# Patient Record
Sex: MALE | HISPANIC OR LATINO | Employment: PART TIME | URBAN - METROPOLITAN AREA
[De-identification: names, ages, dates, MRNs, and addresses within clinical notes are randomized per-mention and may not be internally consistent; named-entity substitution may affect disease eponyms.]

---

## 2024-01-09 ENCOUNTER — TELEPHONE (OUTPATIENT)
Dept: ORTHOPEDIC SURGERY | Facility: CLINIC | Age: 54
End: 2024-01-09

## 2024-01-09 NOTE — TELEPHONE ENCOUNTER
Caller: [facility]ANIA [employer] [facility]     Relationship to patient: WORK COMP     Best call back number: REQUESTING CALL BACK TO PATIENT [phone]    Chief complaint: LEFT SHOULDER     Type of visit: MRI FOLLOW UP  MRI MOVED UP TO 01/22/24    Requested date: WEEK OF 01/22/24     If rescheduling, when is the original appointment: 02/26/24     Additional notes:WORK COMP

## 2024-01-22 ENCOUNTER — HOSPITAL ENCOUNTER (OUTPATIENT)
Dept: MRI IMAGING | Facility: HOSPITAL | Age: 54
Discharge: HOME | End: 2024-01-22
Admitting: PHYSICIAN ASSISTANT
Payer: COMMERCIAL

## 2024-01-22 DIAGNOSIS — Z47.89 ORTHOPEDIC AFTERCARE: ICD-10-CM

## 2024-01-22 DIAGNOSIS — M25.512 ACUTE PAIN OF LEFT SHOULDER: ICD-10-CM

## 2024-01-22 PROCEDURE — 73221 MRI JOINT UPR EXTREM W/O DYE: CPT

## 2024-01-25 ENCOUNTER — OFFICE VISIT (OUTPATIENT)
Dept: ORTHOPEDIC SURGERY | Facility: CLINIC | Age: 54
End: 2024-01-25
Payer: COMMERCIAL

## 2024-01-25 VITALS — BODY MASS INDEX: 36.51 KG/M2 | HEIGHT: 70 IN | WEIGHT: 255 LBS | HEART RATE: 81 BPM

## 2024-01-25 DIAGNOSIS — M25.512 ACUTE PAIN OF LEFT SHOULDER: Primary | ICD-10-CM

## 2024-01-25 DIAGNOSIS — M75.122 COMPLETE TEAR OF LEFT ROTATOR CUFF, UNSPECIFIED WHETHER TRAUMATIC: ICD-10-CM

## 2024-01-25 RX ORDER — CELECOXIB 200 MG/1
200 CAPSULE ORAL DAILY
Qty: 30 CAPSULE | Refills: 2 | Status: SHIPPED | OUTPATIENT
Start: 2024-01-25

## 2024-01-25 NOTE — PROGRESS NOTES
"     Patient ID: Ajith Katz is a 54 y.o. male.  8/23/23 left shoulder arthroscopy with supraspinatus repair and subacromial decompression  Having continued pain despite therapy    Review of Systems:        Objective:    Pulse 81   Ht 177.8 cm (70\")   Wt 116 kg (255 lb)   BMI 36.59 kg/m²     Physical Examination:  Left shoulder healed incision passive elevation 170 abduction 140 external rotation 50 he demonstrates pain and mild weakness on Speed Estill supraspinatus testing, however belly press and liftoff are 5/5.  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

## 2024-01-30 ENCOUNTER — TREATMENT (OUTPATIENT)
Dept: PHYSICAL THERAPY | Facility: CLINIC | Age: 54
End: 2024-01-30
Payer: COMMERCIAL

## 2024-01-30 DIAGNOSIS — Z98.890 S/P LEFT ROTATOR CUFF REPAIR: ICD-10-CM

## 2024-01-30 DIAGNOSIS — R29.898 LEFT ARM WEAKNESS: ICD-10-CM

## 2024-01-30 DIAGNOSIS — M25.512 CHRONIC LEFT SHOULDER PAIN: Primary | ICD-10-CM

## 2024-01-30 DIAGNOSIS — G89.29 CHRONIC LEFT SHOULDER PAIN: Primary | ICD-10-CM

## 2024-01-30 NOTE — PROGRESS NOTES
Physical Therapy Re-Evaluation and Treatment Note  [address] [address] [address]  [place] [address]      Patient: Ajith Katz  : 1970  Referring Practitioner: Andreas Turcios, *  Date of Initial Visit: Type: THERAPY  Noted: 2023  Today's Date: 2024  Patient seen for: 36 sessions      Visit Diagnoses:     ICD-10-CM ICD-9-CM   1. S/P left rotator cuff repair  Z98.890 V45.89   2. Chronic left shoulder pain  M25.512 719.41    G89.29 338.29   3. Left arm weakness  R29.898 729.89       Subjective   Ajith Katz reports the shldr pain is 0 -8 ( with certain movements).  No NT.   No difficulty with sleep ( BSL).     He has been doing pulleys,     Subjective Questionnaire: QuickDASH:  37/55 ( 40/55  23)     Objective   L shldr  Flex:   120A/ 155AA  Abd   101A/  148AA  ER @ 0 abd  43A  BTH  mid back of the head  BTB    mid buttock     MMT:  4- all L shldr planes,    L elbow 5/5   See Exercise, Manual, and Modality Logs for complete treatment.     Patient Education:  review of the current treatment plan for 3x per week x's 1 month per MD order.  Treatment focus on ROM and strengthening.       Assessment & Plan       Plan  Therapy options: will be seen for skilled therapy services  Planned modality interventions: cryotherapy, high voltage pulsed current (pain management), ultrasound and thermotherapy (hydrocollator packs)  Planned therapy interventions: manual therapy, neuromuscular re-education, stretching, strengthening, transfer training, therapeutic activities, home exercise program, joint mobilization, flexibility and soft tissue mobilization  Frequency: 3x week  Duration in weeks: 12      Ajith has returned to PT as ortho has written another order for therapy x's 1 month.   MRI completed wo evidence of retear of RC, presence of supraspinatus and infraspinatus tendonitis,  sub-acromial bursitis and mild GH arthritis noted.   He is seen in the clinic today for a  30-day progress note and treatment.  There is min change of active/active assist motion or strength from his last session at the end of Dec.  OPPT remains indicated in order to achieve the stated goals and achieve maximal functional mobility with ultimate goal of return to work.    STGs in 4 weeks:  Decrease pain to 4/10 on average -- MET 10/6  1)Increase L shld PROM to 90 degrees flex & 45 degrees ER -- MET 10/6  2)Pt will be compliant with HEP & 3)protocol/precautions -- MET 10/6     LTGs by discharge  1)  Increase L shld/elbow ROM to WFL/WNL with min/no pain -- PROGRESSING, increasing motion but still limited and with pain  2)  Increase UE strength to 5/5 -- NOT MET  3)  Pt will be able to sit/ride/drive 30-60 mins without difficulty or pain -- PROGRESSING, 5-10 min  4)  Pt will be able to wash/dress/groom without difficulty or pain -- progressing 12/4/23  5)  Pt will be able to reach/lift items into/out of an overhead cabinet without difficulty or pain --  met 1/30/24   6)  Pt will be able to lift/carry laundry baskets, garbage/grocery bags, pots/pans, O2 tanks without difficulty or pain -- partial met 1/30/24   7)  Pt will be able to perform essential job duties including performing: CPR, intubations, EKGs, blood draws, and assisting movement of patients or preventing them from falling with minimal difficulty or increased pain -- not met 1/30/24   7)  Pt will be able to return to bike riding, kayaking, riding motorcycle and driving without difficulty or increased pain -- NOT tested due to weather 1/30/24 stationary bike at home with UE wt bearing increases pain.   8)  Pt will be able to sleep without waking from pain most nights -- met 1/30/24     Plan:  stretching, ROM and strengthening L shldr.   IFC/ice as needed for pain relief.           Timed:         Manual Therapy:    16     mins  89504;     Therapeutic Exercise:    15     mins  06121;     Neuromuscular Agustín:        mins  91865;    Therapeutic Activity:      14     mins  18036;     Gait Training:           mins  25769;     Ultrasound:          mins  21007;    Ionto                                   mins   45363  Self Care                            mins   36246      Un-Timed:  Electrical Stimulation:    15     mins  06470 ( );  Traction          mins 96751    Timed Treatment:   45   mins   Total Treatment:     60   mins              Shea Borjas PT    Physical Therapist     Certification Period:  1/30/2024 to 4/29/2024  I certify that the therapy services are furnished while this patient is under my care.  The services outlined above are required by this patient, and will be reviewed every 90 days.                PHYSICIAN: Andreas Turcios MD                                           DATE:      Please sign and return via fax to [phone]. Thank you, [facility].

## 2024-01-31 ENCOUNTER — TREATMENT (OUTPATIENT)
Dept: PHYSICAL THERAPY | Facility: CLINIC | Age: 54
End: 2024-01-31
Payer: COMMERCIAL

## 2024-01-31 DIAGNOSIS — Z98.890 S/P LEFT ROTATOR CUFF REPAIR: Primary | ICD-10-CM

## 2024-01-31 DIAGNOSIS — M25.512 CHRONIC LEFT SHOULDER PAIN: ICD-10-CM

## 2024-01-31 DIAGNOSIS — R29.898 LEFT ARM WEAKNESS: ICD-10-CM

## 2024-01-31 DIAGNOSIS — G89.29 CHRONIC LEFT SHOULDER PAIN: ICD-10-CM

## 2024-01-31 NOTE — PROGRESS NOTES
Physical Therapy Daily Treatment Note      [facility] [place]              [address]                [facility] [place]  [address]        Patient: Ajith Katz   : 1970  Diagnosis/ICD-10 Code:  S/P left rotator cuff repair [Z98.890]  Referring practitioner: No ref. provider found  Date of Initial Visit: Type: THERAPY  Noted: 2023  Today's Date: 2024  Patient seen for 37 sessions         Subjective    Ajith Katz reports: his shoulder didn't hurt when he woke up this morning but he is taking an anti-inflammatory.           Objective   See Exercise, Manual, and Modality Logs for complete treatment.       Assessment/Plan  Increased pain during the session especially in the ant shoulder and bicep tendon.  Trial of US to assist with decreased inflammation.  Focused on ROM and scapular strengthening today as pt had session yesterday as well.  Will monitor tolerance to changes and progress as able.  Encouraged pt to use TENS unit as well.     Progress per Plan of Care           Timed:  Manual Therapy:    12     mins  27857;  Therapeutic Exercise:    20     mins  27617;     Neuromuscular Agustín:        mins  25641;    Therapeutic Activity:     10     mins  50995;     Gait Training:           mins  89174;     Ultrasound:     9     mins  56282;     Work Conditioning/Hardening (initial 2 hours)        mins  12149  Work Conditioning/Hardening (each add'l hour)        mins  47079    Untimed:   Electrical Stimulation:         mins  27694 ( );  Traction          mins 35950    Timed Treatment:   51   mins   Total Treatment:     51   mins    Perlita Leon PTA  Physical Therapist Assistant

## 2024-02-02 ENCOUNTER — TREATMENT (OUTPATIENT)
Dept: PHYSICAL THERAPY | Facility: CLINIC | Age: 54
End: 2024-02-02
Payer: COMMERCIAL

## 2024-02-02 DIAGNOSIS — R29.898 LEFT ARM WEAKNESS: ICD-10-CM

## 2024-02-02 DIAGNOSIS — M25.512 CHRONIC LEFT SHOULDER PAIN: ICD-10-CM

## 2024-02-02 DIAGNOSIS — Z98.890 S/P LEFT ROTATOR CUFF REPAIR: Primary | ICD-10-CM

## 2024-02-02 DIAGNOSIS — G89.29 CHRONIC LEFT SHOULDER PAIN: ICD-10-CM

## 2024-02-02 NOTE — PROGRESS NOTES
Physical Therapy Daily Treatment Note      [facility] [place]              [address], [address]                [place]  [address]        Patient: Ajith Katz   : 1970  Diagnosis/ICD-10 Code:  S/P left rotator cuff repair [Z98.890]  Referring practitioner: ALEX Florentino  Date of Initial Visit: Type: THERAPY  Noted: 2023  Today's Date: 2024  Patient seen for 38 sessions         Subjective    Ajith Katz reports: his shoulder is sore today.  He rated it a 4-5/10.  He thinks the US helped.           Objective   See Exercise, Manual, and Modality Logs for complete treatment.       Assessment/Plan  Discussed focusing on bicep pain relief with US and taping in combination with shoulder complex strengthening to decrease stress on bicep/bicep tendon.  Pt verbalized understanding.  Progressed prone exercises to increase scapular strengthening for GH joint support and stability.  Continued increased pain with exercises especially in anterior shoulder/bicep tendon.  Will monitor tolerance and continue to progress as able.     Progress per Plan of Care           Timed:  Manual Therapy:    13     mins  31355;  Therapeutic Exercise:    25     mins  89779;     Neuromuscular Agustín:        mins  88224;    Therapeutic Activity:     10     mins  97338;     Gait Training:           mins  96555;     Ultrasound:     9     mins  21555;     Work Conditioning/Hardening (initial 2 hours)        mins  98480  Work Conditioning/Hardening (each add'l hour)        mins  82721    Untimed:   Electrical Stimulation:    15     mins  02528 (MC );  Traction          mins 13948    Timed Treatment:   57   mins   Total Treatment:     72   mins    Perlita Leon PTA  Physical Therapist Assistant

## 2024-02-05 ENCOUNTER — TREATMENT (OUTPATIENT)
Dept: PHYSICAL THERAPY | Facility: CLINIC | Age: 54
End: 2024-02-05
Payer: COMMERCIAL

## 2024-02-05 DIAGNOSIS — M25.512 CHRONIC LEFT SHOULDER PAIN: ICD-10-CM

## 2024-02-05 DIAGNOSIS — R29.898 LEFT ARM WEAKNESS: ICD-10-CM

## 2024-02-05 DIAGNOSIS — Z98.890 S/P LEFT ROTATOR CUFF REPAIR: Primary | ICD-10-CM

## 2024-02-05 DIAGNOSIS — G89.29 CHRONIC LEFT SHOULDER PAIN: ICD-10-CM

## 2024-02-05 PROCEDURE — 97140 MANUAL THERAPY 1/> REGIONS: CPT | Performed by: PHYSICAL THERAPIST

## 2024-02-05 PROCEDURE — 97014 ELECTRIC STIMULATION THERAPY: CPT | Performed by: PHYSICAL THERAPIST

## 2024-02-05 PROCEDURE — 97530 THERAPEUTIC ACTIVITIES: CPT | Performed by: PHYSICAL THERAPIST

## 2024-02-05 PROCEDURE — 97110 THERAPEUTIC EXERCISES: CPT | Performed by: PHYSICAL THERAPIST

## 2024-02-05 PROCEDURE — 97035 APP MDLTY 1+ULTRASOUND EA 15: CPT | Performed by: PHYSICAL THERAPIST

## 2024-02-05 NOTE — PROGRESS NOTES
Physical Therapy Daily Treatment Note      [facility] [place]              [address]                [facility] [place]  [address]        Patient: Ajith Katz   : 1970  Diagnosis/ICD-10 Code:  S/P left rotator cuff repair [Z98.890]  Referring practitioner: ALEX Florentino  Date of Initial Visit: Type: THERAPY  Noted: 2023  Today's Date: 2024  Patient seen for 39 sessions         Subjective    Ajith Katz reports: his shoulder is doing pretty good when it is at rest but the exercises and reaching for doors still make sit hurt. He said the tape helped but not as much as it had before.           Objective   See Exercise, Manual, and Modality Logs for complete treatment.       Assessment/Plan  Progressed strengthening as noted.  He continued to have pain at available end range PROM as well as with strengthening exercises.  Continued with US to bicep/ant shoulder d/t to continued pain and tenderness to palpation in this area.  Estim and KT tape also provided for pain relief.  May try AROM against gravity to 90 deg next session rather than supine/sidelying exercises.      Progress per Plan of Care           Timed:  Manual Therapy:    11     mins  18906;  Therapeutic Exercise:    15     mins  40983;     Neuromuscular Agustín:        mins  01700;    Therapeutic Activity:     10     mins  50804;     Gait Training:           mins  16139;     Ultrasound:     9     mins  72250;     Work Conditioning/Hardening (initial 2 hours)        mins  55806  Work Conditioning/Hardening (each add'l hour)        mins  53546    Untimed:   Electrical Stimulation:    15     mins  05718 (MC );  Traction          mins 46731    Timed Treatment:   45   mins   Total Treatment:     60   mins    Perlita Leon PTA  Physical Therapist Assistant

## 2024-02-07 ENCOUNTER — TREATMENT (OUTPATIENT)
Dept: PHYSICAL THERAPY | Facility: CLINIC | Age: 54
End: 2024-02-07
Payer: COMMERCIAL

## 2024-02-07 DIAGNOSIS — G89.29 CHRONIC LEFT SHOULDER PAIN: ICD-10-CM

## 2024-02-07 DIAGNOSIS — R29.898 LEFT ARM WEAKNESS: ICD-10-CM

## 2024-02-07 DIAGNOSIS — Z98.890 S/P LEFT ROTATOR CUFF REPAIR: Primary | ICD-10-CM

## 2024-02-07 DIAGNOSIS — M25.512 CHRONIC LEFT SHOULDER PAIN: ICD-10-CM

## 2024-02-09 ENCOUNTER — TREATMENT (OUTPATIENT)
Dept: PHYSICAL THERAPY | Facility: CLINIC | Age: 54
End: 2024-02-09
Payer: COMMERCIAL

## 2024-02-09 DIAGNOSIS — Z98.890 S/P LEFT ROTATOR CUFF REPAIR: Primary | ICD-10-CM

## 2024-02-09 DIAGNOSIS — G89.29 CHRONIC LEFT SHOULDER PAIN: ICD-10-CM

## 2024-02-09 DIAGNOSIS — M25.512 CHRONIC LEFT SHOULDER PAIN: ICD-10-CM

## 2024-02-09 DIAGNOSIS — R29.898 LEFT ARM WEAKNESS: ICD-10-CM

## 2024-02-09 NOTE — PROGRESS NOTES
Physical Therapy Daily Treatment Note      [facility] [place]              [address]                [facility] [place]  [address]        Patient: Ajith Katz   : 1970  Diagnosis/ICD-10 Code:  S/P left rotator cuff repair [Z98.890]  Referring practitioner: ALEX Florentino  Date of Initial Visit: Type: THERAPY  Noted: 2023  Today's Date: 2024  Patient seen for 41 sessions         Subjective    Ajith Katz reports: his shoulder was hurting last night and this morning but right now it isn't hurting.             Objective   See Exercise, Manual, and Modality Logs for complete treatment.       Assessment/Plan  Pt continued to demonstrate increased PROM with decreased tightness however continued to be present and painful at end range.  IR improved to WFL with flexion and ABD however ER continued to be limited. Continued to progress exercises as noted with continued focus on scapular strengthening to improve form with shoulder strengthening.  Minimal shoulder hike continued to be present with shoulder AROM.  Re-educated on proper technique with doorway stretch as pectoralis continued to be tight and this may be affecting anterior shoulder pain.  Educated pt to do this exercise daily.  Will monitor and progress as able.     Progress per Plan of Care           Timed:  Manual Therapy:    10     mins  04493;  Therapeutic Exercise:    8     mins  98354;     Neuromuscular Agustín:        mins  76304;    Therapeutic Activity:     5     mins  81337;     Gait Training:           mins  04570;     Ultrasound:     9     mins  58585;     Work Conditioning/Hardening (initial 2 hours)        mins  49242  Work Conditioning/Hardening (each add'l hour)        mins  59699    Untimed:   Electrical Stimulation:    15     mins  15836 ( );  Traction          mins 22118    Timed Treatment:   32   mins   Total Treatment:     47   mins    Perlita Leon PTA  Physical Therapist Assistant

## 2024-02-13 ENCOUNTER — TREATMENT (OUTPATIENT)
Dept: PHYSICAL THERAPY | Facility: CLINIC | Age: 54
End: 2024-02-13
Payer: COMMERCIAL

## 2024-02-13 DIAGNOSIS — Z98.890 S/P LEFT ROTATOR CUFF REPAIR: Primary | ICD-10-CM

## 2024-02-13 DIAGNOSIS — G89.29 CHRONIC LEFT SHOULDER PAIN: ICD-10-CM

## 2024-02-13 DIAGNOSIS — R29.898 LEFT ARM WEAKNESS: ICD-10-CM

## 2024-02-13 DIAGNOSIS — M25.512 CHRONIC LEFT SHOULDER PAIN: ICD-10-CM

## 2024-02-13 PROCEDURE — 97530 THERAPEUTIC ACTIVITIES: CPT | Performed by: PHYSICAL THERAPIST

## 2024-02-13 PROCEDURE — 97110 THERAPEUTIC EXERCISES: CPT | Performed by: PHYSICAL THERAPIST

## 2024-02-13 PROCEDURE — 97140 MANUAL THERAPY 1/> REGIONS: CPT | Performed by: PHYSICAL THERAPIST

## 2024-02-13 PROCEDURE — 97035 APP MDLTY 1+ULTRASOUND EA 15: CPT | Performed by: PHYSICAL THERAPIST

## 2024-02-15 ENCOUNTER — TREATMENT (OUTPATIENT)
Dept: PHYSICAL THERAPY | Facility: CLINIC | Age: 54
End: 2024-02-15
Payer: COMMERCIAL

## 2024-02-15 DIAGNOSIS — Z98.890 S/P LEFT ROTATOR CUFF REPAIR: Primary | ICD-10-CM

## 2024-02-15 DIAGNOSIS — M25.512 CHRONIC LEFT SHOULDER PAIN: ICD-10-CM

## 2024-02-15 DIAGNOSIS — R29.898 LEFT ARM WEAKNESS: ICD-10-CM

## 2024-02-15 DIAGNOSIS — G89.29 CHRONIC LEFT SHOULDER PAIN: ICD-10-CM

## 2024-02-20 ENCOUNTER — TREATMENT (OUTPATIENT)
Dept: PHYSICAL THERAPY | Facility: CLINIC | Age: 54
End: 2024-02-20
Payer: COMMERCIAL

## 2024-02-20 DIAGNOSIS — Z98.890 S/P LEFT ROTATOR CUFF REPAIR: Primary | ICD-10-CM

## 2024-02-20 DIAGNOSIS — G89.29 CHRONIC LEFT SHOULDER PAIN: ICD-10-CM

## 2024-02-20 DIAGNOSIS — M25.512 CHRONIC LEFT SHOULDER PAIN: ICD-10-CM

## 2024-02-20 DIAGNOSIS — R29.898 LEFT ARM WEAKNESS: ICD-10-CM

## 2024-02-20 NOTE — PROGRESS NOTES
Physical Therapy Daily Treatment Note      [facility]              [address]                [facility] [place]  [address]        Patient: Ajith Katz   : 1970  Diagnosis/ICD-10 Code:  S/P left rotator cuff repair [Z98.890]  Referring practitioner: ALEX Florentino  Date of Initial Visit: Type: THERAPY  Noted: 2023  Today's Date: 2024  Patient seen for 44 sessions         Subjective    Ajith Katz reports: his shoulder is doing pretty good.  He reported 3/10. He said the pain in the front of the shoulder and that goes down the arm is better.           Objective          Active Range of Motion   Left Shoulder   Flexion: 140 degrees   Abduction: 103 degrees   External rotation BTH: Active external rotation behind the head: occiput.   Internal rotation BTB: L4     Strength/Myotome Testing     Left Shoulder     Planes of Motion   Flexion: 4-   Adduction: 4-   External rotation at 0°: 4   Internal rotation at 0°: 5       See Exercise, Manual, and Modality Logs for complete treatment.       Assessment/Plan  Improved ROM and strength as noted however deficits continued to be noted.  Continued to progress strengthening as noted with fatigue and pain present.  ROM continued to be painful however he reported decreased pain overall.  Tenderness continued to be present in the ant shoulder and bicep tendon however pt reported this area hasn't been as painful recently.  Will monitor tolerance to progressions and wait for MD instructions.    Progress per Plan of Care           Timed:  Manual Therapy:    15     mins  09747;  Therapeutic Exercise:    13     mins  56211;     Neuromuscular Agustín:        mins  46372;    Therapeutic Activity:     15     mins  78701;     Gait Training:           mins  12178;     Ultrasound:     9     mins  63819;     Work Conditioning/Hardening (initial 2 hours)        mins  19449  Work Conditioning/Hardening (each add'l hour)        mins   78082    Untimed:   Electrical Stimulation:    15     mins  13986 ( );  Traction          mins 51729    Timed Treatment:   52   mins   Total Treatment:     67   mins    Perlita Leon PTA  Physical Therapist Assistant   Endorse pt to next shift RN. Awaiting reevaluation and disposition. No distress.

## 2024-02-21 ENCOUNTER — TELEPHONE (OUTPATIENT)
Dept: PHYSICAL THERAPY | Facility: CLINIC | Age: 54
End: 2024-02-21
Payer: COMMERCIAL

## 2024-02-21 NOTE — TELEPHONE ENCOUNTER
Caller: THANIA    Relationship: Other    Best call back [phone]       What was the call regarding:  WITH PANDA WOULD LIKE YOU TO GIVE HER A CALL

## 2024-02-22 ENCOUNTER — OFFICE VISIT (OUTPATIENT)
Dept: ORTHOPEDIC SURGERY | Facility: CLINIC | Age: 54
End: 2024-02-22
Payer: COMMERCIAL

## 2024-02-22 VITALS — HEIGHT: 70 IN | BODY MASS INDEX: 36.51 KG/M2 | HEART RATE: 74 BPM | WEIGHT: 255 LBS

## 2024-02-22 DIAGNOSIS — M75.122 COMPLETE TEAR OF LEFT ROTATOR CUFF, UNSPECIFIED WHETHER TRAUMATIC: Primary | ICD-10-CM

## 2024-02-22 RX ORDER — CELECOXIB 200 MG/1
200 CAPSULE ORAL DAILY
Qty: 30 CAPSULE | Refills: 2 | Status: SHIPPED | OUTPATIENT
Start: 2024-02-22

## 2024-02-22 NOTE — PROGRESS NOTES
"     Patient ID: Ajith Katz is a 54 y.o. male.    8/23/23 left shoulder arthroscopy with supraspinatus repair and subacromial decompression   Continues to get stronger with therapy Celebrex has helped mildly to moderately  Review of Systems:        Objective:    Pulse 74   Ht 177.8 cm (70\")   Wt 116 kg (255 lb)   BMI 36.59 kg/m²     Physical Examination:     Left shoulder healed incisions passive elevation 170 abduction 140 external patient 50 he has still some minimal pain but much improved strength on Speed Doss supraspinatus testing belly press left upper 5/5 active elevation is 150 degrees    Imaging:       Assessment:    Improving after cuff repair    Plan:   Transition to outpatient home exercise program continue Celebrex as needed he can return to work without restrictions as of February 26 will be at [facility] 1 month after that see me as needed      Procedures          Disclaimer: Part of this note may be an electronic transcription/translation of spoken language to printed text using the Dragon Dictation System  "

## 2024-03-27 ENCOUNTER — OFFICE VISIT (OUTPATIENT)
Age: 54
End: 2024-03-27
Payer: OTHER GOVERNMENT

## 2024-03-27 VITALS
BODY MASS INDEX: 37.65 KG/M2 | HEART RATE: 72 BPM | OXYGEN SATURATION: 97 % | WEIGHT: 263 LBS | SYSTOLIC BLOOD PRESSURE: 132 MMHG | HEIGHT: 70 IN | TEMPERATURE: 98.3 F | DIASTOLIC BLOOD PRESSURE: 87 MMHG

## 2024-03-27 DIAGNOSIS — R19.8 IRREGULAR BOWEL HABITS: ICD-10-CM

## 2024-03-27 DIAGNOSIS — K64.8 BLEEDING INTERNAL HEMORRHOIDS: Primary | ICD-10-CM

## 2024-03-27 PROCEDURE — 46600 DIAGNOSTIC ANOSCOPY SPX: CPT | Performed by: STUDENT IN AN ORGANIZED HEALTH CARE EDUCATION/TRAINING PROGRAM

## 2024-03-27 PROCEDURE — 99203 OFFICE O/P NEW LOW 30 MIN: CPT | Performed by: STUDENT IN AN ORGANIZED HEALTH CARE EDUCATION/TRAINING PROGRAM

## 2024-03-27 RX ORDER — CHLORAL HYDRATE 500 MG
CAPSULE ORAL
COMMUNITY
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
## 2024-04-25 ENCOUNTER — OFFICE VISIT (OUTPATIENT)
Age: 54
End: 2024-04-25
Payer: OTHER GOVERNMENT

## 2024-04-25 VITALS
HEIGHT: 70 IN | BODY MASS INDEX: 36.79 KG/M2 | TEMPERATURE: 98.6 F | DIASTOLIC BLOOD PRESSURE: 78 MMHG | SYSTOLIC BLOOD PRESSURE: 124 MMHG | OXYGEN SATURATION: 99 % | HEART RATE: 71 BPM | WEIGHT: 257 LBS

## 2024-04-25 DIAGNOSIS — R19.8 IRREGULAR BOWEL HABITS: ICD-10-CM

## 2024-04-25 DIAGNOSIS — K64.8 BLEEDING INTERNAL HEMORRHOIDS: Primary | ICD-10-CM

## 2024-04-25 DIAGNOSIS — K62.89 ANORECTAL PAIN: ICD-10-CM

## 2024-04-27 NOTE — PROGRESS NOTES
Colorectal Surgery Followup Note    ID:  Ajith Givens;   : 1970  DATE OF VISIT: 2024    Chief Complaint  Follow-up (4 week follow for hemorrhoids )       Subjective    Mr. Givens report only one episode of bleeding. He reports his BM has been regular. He has been taking his metamucil fiber.   Exam  General:  No acute distress  Head: Normocephalic, atraumatic  Neuro: Alert and oriented      External anorectal exam: normal, no irritation of the perianal skin   Digital rectal exam:  tenderness with exam, increased anal tone, no palpable masses, no anal fissure     Procedure Note  Procedure: Anoscopy  Indication: rectal bleeding  Description: After digital examination was completed the scope was inserted into the anal canal. The anal canal was visualized to 4-5 cm. See Findings below. The scope was then removed.   Findings: Internal hemorrhoids appear  enlarged in all quadrant, no anal fissure     Assessment  - -Symptomatic internal hemorrhoids  -Anorectal bleeding   -Anorectal pain   -Pruritis ani   -irregular bowel habits     Plan / Recommendations  -Despite no anal fissure is identified, he has significant pain with examination with increased anal tone. I have encouraged him to increase water intake and metamucil fiber. I have sent Nifedipine and lidocaine to help with his pain.   - Hold off for hemorrhoid rubber band ligation   - follow up in 4-6 weeks       Deshawn Lopez MD  Colon and Rectal Surgery   [facility]

## 2024-06-21 ENCOUNTER — OFFICE VISIT (OUTPATIENT)
Age: 54
End: 2024-06-21
Payer: OTHER GOVERNMENT

## 2024-06-21 VITALS
BODY MASS INDEX: 34.79 KG/M2 | TEMPERATURE: 97.8 F | DIASTOLIC BLOOD PRESSURE: 77 MMHG | SYSTOLIC BLOOD PRESSURE: 115 MMHG | OXYGEN SATURATION: 97 % | HEART RATE: 71 BPM | WEIGHT: 243 LBS | HEIGHT: 70 IN

## 2024-06-21 DIAGNOSIS — K64.8 BLEEDING INTERNAL HEMORRHOIDS: Primary | ICD-10-CM

## 2024-06-21 DIAGNOSIS — K62.89 ANORECTAL PAIN: ICD-10-CM

## 2024-06-21 DIAGNOSIS — R19.8 IRREGULAR BOWEL HABITS: ICD-10-CM

## 2024-06-22 NOTE — PROGRESS NOTES
Colorectal Surgery Followup Note    ID:  Ajith Givens;   : 1970  DATE OF VISIT: 2024    Chief Complaint  Follow-up (F/U for Hemorrhoids )       Subjective     Mr. Givens reports no further bleeding or pain. He reports his BM has been regular. He has been taking his metamucil fiber.   Exam  General:  No acute distress  Head: Normocephalic, atraumatic  Neuro: Alert and oriented      Assessment  - -Symptomatic internal hemorrhoids  -Anorectal bleeding   -Anorectal pain   -Pruritis ani   -irregular bowel habits      Plan / Recommendations  - doing well, no further symptoms   - hold off on band ligation   - hold off on pelvic PT   - continue with metamucil fiber   - follow up in 4-6 weeks         Deshawn Lopez MD  Colon and Rectal Surgery   [facility]

## 2024-08-08 ENCOUNTER — OFFICE VISIT (OUTPATIENT)
Dept: ORTHOPEDIC SURGERY | Facility: CLINIC | Age: 54
End: 2024-08-08
Payer: COMMERCIAL

## 2024-08-08 VITALS — HEART RATE: 76 BPM | WEIGHT: 239 LBS | BODY MASS INDEX: 34.22 KG/M2 | HEIGHT: 70 IN

## 2024-08-08 DIAGNOSIS — M75.122 COMPLETE TEAR OF LEFT ROTATOR CUFF, UNSPECIFIED WHETHER TRAUMATIC: Primary | ICD-10-CM

## 2024-08-08 PROCEDURE — 99213 OFFICE O/P EST LOW 20 MIN: CPT | Performed by: ORTHOPAEDIC SURGERY

## 2024-08-08 RX ORDER — CELECOXIB 200 MG/1
200 CAPSULE ORAL DAILY
Qty: 30 CAPSULE | Refills: 2 | Status: SHIPPED | OUTPATIENT
Start: 2024-08-08

## 2024-08-08 NOTE — PROGRESS NOTES
Patient ID: Ajith Givens is a 54 y.o. male.  8/23/23 left shoulder arthroscopy with supraspinatus repair and subacromial decompression   Having some feeling of pain and weakness reaching away from his body no recent injury has stopped taking Celebrex  Review of Systems:        Objective:    There were no vitals taken for this visit.    Physical Examination:     Left shoulder no tenderness passive elevation 170 abduction 150 external rotation 50 internal rotation L5 with mild pain on Speed testing [place] and supraspinatus are negative belly press and liftoff are 5/5    Imaging:       Assessment:    Pain after cuff repair    Plan:   I recommend going back on the Celebrex.  Doing  a month or so of therapy activity as tolerated see me as needed      Procedures          Disclaimer: Part of this note may be an electronic transcription/translation of spoken language to printed text using the Dragon Dictation System

## 2024-08-13 ENCOUNTER — TREATMENT (OUTPATIENT)
Dept: PHYSICAL THERAPY | Facility: CLINIC | Age: 54
End: 2024-08-13
Payer: COMMERCIAL

## 2024-08-13 DIAGNOSIS — G89.29 CHRONIC LEFT SHOULDER PAIN: Primary | ICD-10-CM

## 2024-08-13 DIAGNOSIS — M25.512 CHRONIC LEFT SHOULDER PAIN: Primary | ICD-10-CM

## 2024-08-13 DIAGNOSIS — M75.122 COMPLETE TEAR OF LEFT ROTATOR CUFF, UNSPECIFIED WHETHER TRAUMATIC: ICD-10-CM

## 2024-08-13 NOTE — PROGRESS NOTES
Physical Therapy Initial Evaluation and Plan of Care    Patient: Ajith Givens   : 1970  Diagnosis/ICD-10 Code:  Chronic left shoulder pain [M25.512, G89.29]  Referring practitioner: Andreas Turcios, *  Date of Initial Visit: 2024  Today's Date: 2024  Patient seen for 1 sessions  PT Clinic location:  [address] [address] [address]  [place] [address]         Subjective Questionnaire: QuickDASH: 23%    Subjective Evaluation    History of Present Illness  Mechanism of injury: History of current condition: Presents with ongoing left shoulder pain following left rotator cuff repair (labral debridement, subacromial decompression & supraspinatus repair) about 1 year ago. Having continued pain and stiffness particularly when reaching overhead or out to the side and definitely when lifting any weight, even a few pounds. Says he is overall maybe only 70% improved since the surgery.    Makes symptoms better: resting shoulder     Reported functional limitation: difficulty at work picking up certain items (difficulty getting flow meter off of wall), can't do any heavy lifting or overhead activity such as changing light bulb. Can't get jug of milk out of refrigerator.           Patient Occupation: respiratory therapist Quality of life: excellent    Pain  At best pain ratin  At worst pain ratin    Patient Goals  Patient goals for therapy: increased motion, increased strength, independence with ADLs/IADLs, return to sport/leisure activities and return to work         Medical history: Anxiety, depression, GERD, GI bleed, HTN, Left rotator cuff repair. See chart for further detail.     Objective          Tenderness     Left Shoulder   Tenderness in the biceps tendon (proximal), bicipital groove, coracoid process and supraspinatus tendon. No tenderness in the acromion and infraspinatus tendon.     Active Range of Motion   Left Shoulder   Flexion: 152 (reports click) degrees with  pain  Abduction: 115 (measurement taken without compensatory movement) degrees with pain  External rotation BTH: C4 with pain  Internal rotation BTB: L2 with pain    Right Shoulder   Flexion: 168 degrees   Abduction: 165 degrees   External rotation BTH: T5 with pain  Internal rotation BTB: L1 with pain    Passive Range of Motion   Left Shoulder   Flexion: 160 degrees with pain  Abduction: 130 degrees with pain  External rotation 90°: 90 degrees   Internal rotation 90°: 50 degrees with pain    Joint Play   Left Shoulder  Hypomobile in the posterior capsule and inferior capsule.    Strength/Myotome Testing     Left Shoulder     Planes of Motion   Flexion: 4   External rotation at 0°: 4-   Internal rotation at 0°: 5     Isolated Muscles   Supraspinatus: 4     Tests     Left Shoulder   Positive Hawkin's and Neer's.   Negative empty can, external rotation lag sign and resisted supine external rotation.           Assessment & Plan       Assessment  Impairments: abnormal or restricted ROM, impaired physical strength, lacks appropriate home exercise program and pain with function   Functional limitations: carrying objects, lifting, pushing, uncomfortable because of pain, reaching behind back and unable to perform repetitive tasks   Assessment details: The patient is a 54 y.o. male who presents to physical therapy today for continued left shoulder pain and tightness s/p rotator cuff tear 1 year ago. Upon initial evaluation, the patient demonstrates the following impairments: decreased glenohumeral mobility, rotator cuff weakness, limited shoulder flexion, abduction & IR. Examination findings indicate subacromial impingement/tendonitis due to limited GH mobility. Due to these impairments, the patient is unable to perform or has difficulty with the following functional tasks: reaching out holding even a few pounds, still has pain at work, unable to do any heavy lifting or household work without pain. The patient would benefit  from skilled PT services to address functional limitations and impairments and to improve patient quality of life.      Prognosis: good    Goals  Plan Goals: ST. Pt will be independent and compliant with initial HEP in 4 weeks.  2. Pt will report a 25% improvement in symptoms since starting therapy in 4 weeks.  3. Pt will report pain level at worst <5 during reaching activity in 4 weeks.  4. Pt will demonstrate an increase in left shoulder flexion AROM to 160 degrees without pain or clicking within 4 weeks.     LTG: - by DC (12 weeks)  1. Pt will be independent with final HEP for self-management of condition by DC.  2. Pt will improve score on QuickDASH to less than 10% impairment by DC.   3. Pt will report a 90% improvement in symptoms by DC in order to allow return to PLOF.  4. Pt will improve left shoulder abduction AROM to at least 160 deg in order to be able to reach out to side for functional tasks by DC.  5. Pt will improve left shoulder ER & flexion strength to at least 5/5 in order to be able to return to moderately heavy lifting by DC.       Plan  Therapy options: will be seen for skilled therapy services  Planned modality interventions: cryotherapy, electrical stimulation/Russian stimulation, TENS, thermotherapy (hydrocollator packs) and ultrasound  Planned therapy interventions: body mechanics training, ADL retraining, flexibility, functional ROM exercises, home exercise program, joint mobilization, manual therapy, motor coordination training, neuromuscular re-education, postural training, soft tissue mobilization, spinal/joint mobilization, strengthening, stretching and therapeutic activities  Frequency: 2x week  Duration in weeks: 12  Treatment plan discussed with: patient        See flowsheets for treatment detail.    History # of Personal Factors and/or Comorbidities: LOW (0)  Examination of Body System(s): # of elements: LOW (1-2)  Clinical Presentation: STABLE   Clinical Decision Making: LOW        Timed:         Manual Therapy:         mins  38518;     Therapeutic Exercise:    12     mins  71821;     Neuromuscular Agustín:        mins  12812;    Therapeutic Activity:          mins  80217;     Gait Training:           mins  73026;     Ultrasound:     8     mins  97790;    Ionto                                   mins   14308  Self Care                            mins   45129      Un-Timed:  Electrical Stimulation:         mins  21043 ( );  Dry Needling          mins self-pay  Traction          mins 55484  Low Eval      25    Mins  03623  Mod Eval          Mins  67722  High Eval                            Mins  67708  Re-Eval                               mins  20864      Timed Treatment:   20   mins   Total Treatment:     45   mins    PT SIGNATURE: Brenda Kendall PT   [place] PT license #: [id number]  [place] PT license #: [id number]  DATE TREATMENT INITIATED: 8/13/2024    Initial Certification  Certification Period: 11/10/2024  I certify that the therapy services are furnished while this patient is under my care.  The services outlined above are required by this patient, and will be reviewed every 90 days.    PHYSICIAN: Andreas Turcios MD  NPI: [id number]                                      DATE:     Please sign and return via fax to  [address] [address] [address]  [place] [address] . Thank you, [facility].

## 2024-08-19 ENCOUNTER — TREATMENT (OUTPATIENT)
Dept: PHYSICAL THERAPY | Facility: CLINIC | Age: 54
End: 2024-08-19
Payer: COMMERCIAL

## 2024-08-19 DIAGNOSIS — G89.29 CHRONIC LEFT SHOULDER PAIN: Primary | ICD-10-CM

## 2024-08-19 DIAGNOSIS — M75.122 COMPLETE TEAR OF LEFT ROTATOR CUFF, UNSPECIFIED WHETHER TRAUMATIC: ICD-10-CM

## 2024-08-19 DIAGNOSIS — M25.512 CHRONIC LEFT SHOULDER PAIN: Primary | ICD-10-CM

## 2024-08-20 NOTE — PROGRESS NOTES
Physical Therapy Daily Treatment Note      [facility] [place]              [address]                [facility] [place]  [address]        Patient: Ajith Givens   : 1970  Diagnosis/ICD-10 Code:  Chronic left shoulder pain [M25.512, G89.29]  Referring practitioner: Andreas Turcios, *  Date of Initial Visit: Type: THERAPY  Noted: 2024  Today's Date: 2024  Patient seen for 2 sessions         Subjective    Ajith Givens reports: his shoulder still hurts.            Objective   See Exercise, Manual, and Modality Logs for complete treatment.     Tenderness to proximal bicep tendon    Assessment/Plan  Continued to have tenderness as noted thus continued with US.  May consider IASTM to address as well.  Added gentle mobs and PROM with pain noted at end ranges.  Initiated scapular strengthening to decrease stress on bicep tendon.  Will monitor and progress or modify based on tolerance.     Progress per Plan of Care           Timed:  Manual Therapy:    10     mins  29481;  Therapeutic Exercise:    10     mins  95343;     Neuromuscular Agustín:        mins  02363;    Therapeutic Activity:          mins  22991;     Gait Training:           mins  43291;     Ultrasound:     9     mins  65915;     Work Conditioning/Hardening (initial 2 hours)        mins  51679  Work Conditioning/Hardening (each add'l hour)        mins  51215    Untimed:   Electrical Stimulation:         mins  85911 ( );  Traction          mins 88992    Timed Treatment:   29   mins   Total Treatment:     29   mins    Perlita Leon PTA  Physical Therapist Assistant

## 2024-08-23 ENCOUNTER — TREATMENT (OUTPATIENT)
Dept: PHYSICAL THERAPY | Facility: CLINIC | Age: 54
End: 2024-08-23
Payer: COMMERCIAL

## 2024-08-23 DIAGNOSIS — G89.29 CHRONIC LEFT SHOULDER PAIN: Primary | ICD-10-CM

## 2024-08-23 DIAGNOSIS — M25.512 CHRONIC LEFT SHOULDER PAIN: Primary | ICD-10-CM

## 2024-08-23 DIAGNOSIS — M75.122 COMPLETE TEAR OF LEFT ROTATOR CUFF, UNSPECIFIED WHETHER TRAUMATIC: ICD-10-CM

## 2024-08-23 NOTE — PROGRESS NOTES
Physical Therapy Daily Treatment Note      [facility] [place]              [address]                [facility] [place]  [address]        Patient: Ajith Givens   : 1970  Diagnosis/ICD-10 Code:  Chronic left shoulder pain [M25.512, G89.29]  Referring practitioner: Andreas Turcios, *  Date of Initial Visit: Type: THERAPY  Noted: 2024  Today's Date: 2024  Patient seen for 3 sessions         Subjective    Ajith Givens reports: his shoulder still hurts mostly in the front of the shoulder.             Objective   See Exercise, Manual, and Modality Logs for complete treatment.     Proximal bicep tenderness    Assessment/Plan  Progressed exercises as noted with focus on pain free motion and activities.  He tolerated these well however continued to have increases soreness/symptoms at the end of the session.  Added wallslides however pt continue to have some pain as well as demonstrated hiking through the midrange and reports of it feeling stuck.  May hold this until more normal movement pattern can be performed consistently.  Will monitor and progress or modify as appropriate.       Progress per Plan of Care           Timed:  Manual Therapy:    15     mins  06182;  Therapeutic Exercise:    25     mins  82533;     Neuromuscular Agustín:        mins  72511;    Therapeutic Activity:     5     mins  01993;     Gait Training:           mins  68752;     Ultrasound:     9     mins  81446;     Work Conditioning/Hardening (initial 2 hours)        mins  62146  Work Conditioning/Hardening (each add'l hour)        mins  42155    Untimed:   Electrical Stimulation:         mins  38562 ( );  Traction          mins 66252    Timed Treatment:   54   mins   Total Treatment:     54   mins    Perlita Leon PTA  Physical Therapist Assistant

## 2024-08-27 ENCOUNTER — TREATMENT (OUTPATIENT)
Dept: PHYSICAL THERAPY | Facility: CLINIC | Age: 54
End: 2024-08-27
Payer: COMMERCIAL

## 2024-08-27 DIAGNOSIS — M75.122 COMPLETE TEAR OF LEFT ROTATOR CUFF, UNSPECIFIED WHETHER TRAUMATIC: ICD-10-CM

## 2024-08-27 DIAGNOSIS — M25.512 CHRONIC LEFT SHOULDER PAIN: Primary | ICD-10-CM

## 2024-08-27 DIAGNOSIS — G89.29 CHRONIC LEFT SHOULDER PAIN: Primary | ICD-10-CM

## 2024-08-27 NOTE — PROGRESS NOTES
Physical Therapy Daily Treatment Note      [facility] [place]              [address]                [facility] [place]  [address]        Patient: Ajith Givens   : 1970  Diagnosis/ICD-10 Code:  Chronic left shoulder pain [M25.512, G89.29]  Referring practitioner: Andreas Turcios, *  Date of Initial Visit: Type: THERAPY  Noted: 2024  Today's Date: 2024  Patient seen for 4 sessions         Subjective    Ajith Givens reports: it's good but it hurts.  It still hurts when he uses it.            Objective   See Exercise, Manual, and Modality Logs for complete treatment.     Tenderness to proximal bicep tendon    Assessment/Plan  Focused on AAROM and scapular strengthening to improve GH mechanics with functional activities.  Several exercises held d/t time constraints today.  Continued with US to assist with pain relief however altered location to include superior and posterior.  Will monitor symptoms and continue to progress as able with focus on pain control and good GH mechanics with all activities.     Progress per Plan of Care           Timed:  Manual Therapy:         mins  76644;  Therapeutic Exercise:    30     mins  58403;     Neuromuscular Agustín:        mins  91902;    Therapeutic Activity:     5     mins  45236;     Gait Training:           mins  92180;     Ultrasound:     9     mins  49365;     Work Conditioning/Hardening (initial 2 hours)        mins  78677  Work Conditioning/Hardening (each add'l hour)        mins  11370    Untimed:   Electrical Stimulation:         mins  47154 ( );  Traction          mins 44485    Timed Treatment:   44   mins   Total Treatment:     44   mins    Perlita Leon PTA  Physical Therapist Assistant

## 2024-08-29 ENCOUNTER — TREATMENT (OUTPATIENT)
Dept: PHYSICAL THERAPY | Facility: CLINIC | Age: 54
End: 2024-08-29
Payer: COMMERCIAL

## 2024-08-29 DIAGNOSIS — M75.122 COMPLETE TEAR OF LEFT ROTATOR CUFF, UNSPECIFIED WHETHER TRAUMATIC: ICD-10-CM

## 2024-08-29 DIAGNOSIS — G89.29 CHRONIC LEFT SHOULDER PAIN: Primary | ICD-10-CM

## 2024-08-29 DIAGNOSIS — M25.512 CHRONIC LEFT SHOULDER PAIN: Primary | ICD-10-CM

## 2024-08-29 NOTE — PATIENT INSTRUCTIONS
Access Code: [email or web address]  URL: [email or web address]  Date: 08/29/2024  Prepared by: Perlita Leon    Exercises  - Supine Shoulder Flexion AAROM with Hands Clasped  - 1-2 x daily - 1 sets - 10 reps - 5 sec hold  - Supine Shoulder Flexion Extension AAROM with Dowel  - 1-2 x daily - 1 sets - 10 reps - 5 sec hold  - Single Arm Doorway Pec Stretch at 90 Degrees Abduction  - 1-2 x daily - 1 sets - 10 reps - 5 sec hold  - Shoulder External Rotation and Scapular Retraction  - 1-2 x daily - 1 sets - 10 reps - 5 sec hold  - Bent Over Single Arm Shoulder Row with Dumbbell  - 1 x daily - 7 x weekly - 1 sets - 20 reps - 5 hold  - Single Arm Bent Over Shoulder Extension with Dumbbell  - 1 x daily - 7 x weekly - 1 sets - 20 reps - 5 hold  - Single Arm Bent Over Shoulder Horizontal Abduction with Dumbbell - Palm Down  - 1 x daily - 7 x weekly - 1 sets - 15 reps

## 2024-08-29 NOTE — PROGRESS NOTES
Physical Therapy Daily Treatment Note      [facility] [place]              [address]                [facility] [place]  [address]        Patient: Ajith Givens   : 1970  Diagnosis/ICD-10 Code:  Chronic left shoulder pain [M25.512, G89.29]  Referring practitioner: Andreas Turcios, *  Date of Initial Visit: Type: THERAPY  Noted: 2024  Today's Date: 2024  Patient seen for 5 sessions         Subjective    Ajith Givens reports: his shoulder is good but it still hurts.  He had a  today and it didn't hurt with his hands behind him for the first time but it did hurt after he shot.   He woke up and his shoulder wasn't hurting but then he lifted his dog and it has been more sore since.             Objective   See Exercise, Manual, and Modality Logs for complete treatment.       Assessment/Plan  Added lat pull downs however maintained exercises today d/t some soreness present.  Focused on scapular strengthening and ROM.  He was able to demonstrate good technique with exercises and improved form especially with T's.  Issued updated HEP today.  Will monitor and progress exercises as tolerated for decreased functional use of UE.      Progress per Plan of Care           Timed:  Manual Therapy:    10     mins  36447;  Therapeutic Exercise:    30     mins  36096;     Neuromuscular Agustín:        mins  09610;    Therapeutic Activity:          mins  18912;     Gait Training:           mins  88081;     Ultrasound:     9     mins  06936;     Work Conditioning/Hardening (initial 2 hours)        mins  18640  Work Conditioning/Hardening (each add'l hour)        mins  16231    Untimed:   Electrical Stimulation:         mins  88492 ( );  Traction          mins 72151    Timed Treatment:   49   mins   Total Treatment:     49   mins    Perlita Leon PTA  Physical Therapist Assistant

## 2024-09-05 ENCOUNTER — TREATMENT (OUTPATIENT)
Dept: PHYSICAL THERAPY | Facility: CLINIC | Age: 54
End: 2024-09-05
Payer: COMMERCIAL

## 2024-09-05 DIAGNOSIS — M25.512 CHRONIC LEFT SHOULDER PAIN: Primary | ICD-10-CM

## 2024-09-05 DIAGNOSIS — G89.29 CHRONIC LEFT SHOULDER PAIN: Primary | ICD-10-CM

## 2024-09-05 NOTE — PROGRESS NOTES
Physical Therapy Daily Treatment Note      [facility] [place]              [address]                [facility] [place]  [address]        Patient: Ajith Givens   : 1970  Diagnosis/ICD-10 Code:  Chronic left shoulder pain [M25.512, G89.29]  Referring practitioner: Andreas Turcios, *  Date of Initial Visit: Type: THERAPY  Noted: 2024  Today's Date: 2024  Patient seen for 6 sessions         Subjective    Ajith Givens reports: it's all right.  -2/10 when he moves it.            Objective   See Exercise, Manual, and Modality Logs for complete treatment.       Assessment/Plan  Continued pain with AROM as well as PROM greater than ~120 deg flexion and ABD.  Continued to focus on scapular strengthening and shoulder strengthening below shoulder height to decrease risk of impingement.  He was able to progress with several exercises and added PNF patterns to assist with strengthening in more functional movement patterns however unable to complete full range on D2.  Will monitor tolerance to progressions and modify as needed.     Progress per Plan of Care           Timed:  Manual Therapy:    10     mins  35194;  Therapeutic Exercise:    10     mins  25179;     Neuromuscular Agustín:        mins  30145;    Therapeutic Activity:          mins  89626;     Gait Training:           mins  53385;     Ultrasound:     9     mins  98243;     Work Conditioning/Hardening (initial 2 hours)        mins  76043  Work Conditioning/Hardening (each add'l hour)        mins  44107    Untimed:   Electrical Stimulation:         mins  88929 (MC );  Traction          mins 29996    Timed Treatment:   29   mins   Total Treatment:     29   mins    Perlita Leon PTA  Physical Therapist Assistant

## 2024-09-10 ENCOUNTER — TREATMENT (OUTPATIENT)
Dept: PHYSICAL THERAPY | Facility: CLINIC | Age: 54
End: 2024-09-10
Payer: COMMERCIAL

## 2024-09-10 DIAGNOSIS — M75.122 COMPLETE TEAR OF LEFT ROTATOR CUFF, UNSPECIFIED WHETHER TRAUMATIC: ICD-10-CM

## 2024-09-10 DIAGNOSIS — M25.512 CHRONIC LEFT SHOULDER PAIN: Primary | ICD-10-CM

## 2024-09-10 DIAGNOSIS — G89.29 CHRONIC LEFT SHOULDER PAIN: Primary | ICD-10-CM

## 2024-09-10 NOTE — PROGRESS NOTES
Physical Therapy Daily Treatment Note      [facility] [place]              [address]                [facility] [place]  [address]        Patient: Ajith Givens   : 1970  Diagnosis/ICD-10 Code:  Chronic left shoulder pain [M25.512, G89.29]  Referring practitioner: Andreas Turcios, *  Date of Initial Visit: Type: THERAPY  Noted: 2024  Today's Date: 9/10/2024  Patient seen for 7 sessions         Subjective    Ajith Givens reports: his shoulder isn't bad.  He was late because he had to drive someone to an appointment that ended up being 4 hrs long. He rode his motorcycle a lot this weekend.           Objective   See Exercise, Manual, and Modality Logs for complete treatment.     Tightness and tenderness to ant shoulder and proximal bicep tendon    Assessment/Plan  Increased tenderness and tightness to proximal bicep tendon.  Thus added STM and continued with US to address.  May add IASTM if it persists.  Numbness in hand/forearm after doorway stretches thus plan to hold next session.  Continued to perform exercises as tolerated with scapular focus to support GH motion.  Will monitor and progress as able.     Progress per Plan of Care           Timed:  Manual Therapy:    15     mins  31413;  Therapeutic Exercise:    25     mins  82331;     Neuromuscular Agustín:        mins  30261;    Therapeutic Activity:          mins  95231;     Gait Training:           mins  94424;     Ultrasound:     9     mins  64274;     Work Conditioning/Hardening (initial 2 hours)        mins  26679  Work Conditioning/Hardening (each add'l hour)        mins  73403    Untimed:   Electrical Stimulation:         mins  31611 ( );  Traction          mins 70815    Timed Treatment:   49   mins   Total Treatment:     49   mins    Perlita Leon PTA  Physical Therapist Assistant

## 2024-09-12 ENCOUNTER — TREATMENT (OUTPATIENT)
Dept: PHYSICAL THERAPY | Facility: CLINIC | Age: 54
End: 2024-09-12
Payer: COMMERCIAL

## 2024-09-12 DIAGNOSIS — M25.512 CHRONIC LEFT SHOULDER PAIN: Primary | ICD-10-CM

## 2024-09-12 DIAGNOSIS — M75.122 COMPLETE TEAR OF LEFT ROTATOR CUFF, UNSPECIFIED WHETHER TRAUMATIC: ICD-10-CM

## 2024-09-12 DIAGNOSIS — G89.29 CHRONIC LEFT SHOULDER PAIN: Primary | ICD-10-CM

## 2024-09-12 NOTE — PROGRESS NOTES
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     QuickDASH: 18%    Objective     Tenderness      Left Shoulder   Tenderness in the biceps tendon (proximal), bicipital groove, coracoid process and supraspinatus tendon. No tenderness in the acromion and infraspinatus tendon.      Active Range of Motion   Left Shoulder   Flexion: 150 degrees with pain  Abduction: 113 (measurement taken without compensatory movement) degrees with pain  External rotation BTH: C7 with pain  Internal rotation BTB: L1 with pain     Right Shoulder   Flexion: 168 degrees   Abduction: 165 degrees   External rotation BTH: T5 with pain  Internal rotation BTB: L1 with pain     Passive Range of Motion   Left Shoulder   Flexion: 160 degrees with pain  Abduction: 130 degrees with pain  External rotation 90°: 90 degrees   Internal rotation 90°: 50 degrees with pain     Joint Play   Left Shoulder  Hypomobile in the posterior capsule and inferior capsule.     Strength/Myotome Testing      Left Shoulder      Planes of Motion   Flexion: 4   External rotation at 0°: 4- (pain)  Internal rotation at 0°: 5      Isolated Muscles   Supraspinatus: 4 (pain)     Tests      Left Shoulder   Positive Hawkin's and Neer's.   Negative empty can, external rotation lag sign and resisted supine external rotation.      See Exercise, Manual, and Modality Logs for complete treatment.     Patient Education: hold HEP for a few days, then resume as able.    Assessment & Plan       Assessment  Assessment details: Per assessment today it does not appear that Honorio has make much progress with therapy. However, he was having significantly higher pain level today which likely effected  his measurements. He does report that prior to yesterday, he did feel like he was making progress with therapy. He continues to demonstrate shoulder ER and flexion weakness.     Requires continued PT to address remaining deficits and return to PLOF.     Plan  Therapy options: will be seen for skilled therapy services       ST. Pt will be independent and compliant with initial HEP in 4 weeks. Met  2. Pt will report a 25% improvement in symptoms since starting therapy in 4 weeks. Not met  3. Pt will report pain level at worst <5 during reaching activity in 4 weeks. Not met  4. Pt will demonstrate an increase in left shoulder flexion AROM to 160 degrees without pain or clicking within 4 weeks. Not met     LTG: - by DC (12 weeks)  1. Pt will be independent with final HEP for self-management of condition by DC.  2. Pt will improve score on QuickDASH to less than 10% impairment by DC.   3. Pt will report a 90% improvement in symptoms by DC in order to allow return to PLOF.  4. Pt will improve left shoulder abduction AROM to at least 160 deg in order to be able to reach out to side for functional tasks by DC.  5. Pt will improve left shoulder ER & flexion strength to at least 5/5 in order to be able to return to moderately heavy lifting by DC.       Progress per Plan of Care; continue current treatment approach, assess response to KT tape. Focus on rotator cuff strengthening below shoulder height and avoid painful stretching. Continue x 1 more months then consider return to MD if no progress made.            Timed:         Manual Therapy:    12     mins  78068;     Therapeutic Exercise:    10     mins  71223;     Neuromuscular Agustín:        mins  27180;    Therapeutic Activity:     10     mins  32744;     Gait Training:           mins  11092;     Ultrasound:          mins  95021;    Ionto:                                   mins   60481  Self Care:                            mins   82620    Un-Timed:  Electrical  Stimulation:         mins  62866 ( );  Dry Needling          mins self-pay  Traction          mins 92750  Re-Eval                               mins  08487    Timed Treatment:   32   mins   Total Treatment:     32   mins    Brenda Kendall PT  Physical Therapist  [place] PT license #: [id number]  [place] PT license #: [id number]

## 2024-09-16 ENCOUNTER — TREATMENT (OUTPATIENT)
Dept: PHYSICAL THERAPY | Facility: CLINIC | Age: 54
End: 2024-09-16
Payer: COMMERCIAL

## 2024-09-16 DIAGNOSIS — M75.122 COMPLETE TEAR OF LEFT ROTATOR CUFF, UNSPECIFIED WHETHER TRAUMATIC: ICD-10-CM

## 2024-09-16 DIAGNOSIS — G89.29 CHRONIC LEFT SHOULDER PAIN: Primary | ICD-10-CM

## 2024-09-16 DIAGNOSIS — M25.512 CHRONIC LEFT SHOULDER PAIN: Primary | ICD-10-CM

## 2024-09-18 ENCOUNTER — TREATMENT (OUTPATIENT)
Dept: PHYSICAL THERAPY | Facility: CLINIC | Age: 54
End: 2024-09-18
Payer: COMMERCIAL

## 2024-09-18 DIAGNOSIS — M75.122 COMPLETE TEAR OF LEFT ROTATOR CUFF, UNSPECIFIED WHETHER TRAUMATIC: ICD-10-CM

## 2024-09-18 DIAGNOSIS — M25.512 CHRONIC LEFT SHOULDER PAIN: Primary | ICD-10-CM

## 2024-09-18 DIAGNOSIS — G89.29 CHRONIC LEFT SHOULDER PAIN: Primary | ICD-10-CM

## 2024-09-26 ENCOUNTER — TREATMENT (OUTPATIENT)
Dept: PHYSICAL THERAPY | Facility: CLINIC | Age: 54
End: 2024-09-26
Payer: COMMERCIAL

## 2024-09-26 DIAGNOSIS — G89.29 CHRONIC LEFT SHOULDER PAIN: Primary | ICD-10-CM

## 2024-09-26 DIAGNOSIS — M75.122 COMPLETE TEAR OF LEFT ROTATOR CUFF, UNSPECIFIED WHETHER TRAUMATIC: ICD-10-CM

## 2024-09-26 DIAGNOSIS — M25.512 CHRONIC LEFT SHOULDER PAIN: Primary | ICD-10-CM

## 2024-11-18 ENCOUNTER — DOCUMENTATION (OUTPATIENT)
Dept: PHYSICAL THERAPY | Facility: CLINIC | Age: 54
End: 2024-11-18
Payer: OTHER GOVERNMENT

## 2024-11-18 RX ORDER — CELECOXIB 200 MG/1
200 CAPSULE ORAL DAILY
Qty: 30 CAPSULE | Refills: 2 | Status: SHIPPED | OUTPATIENT
Start: 2024-11-18

## 2024-11-18 NOTE — PROGRESS NOTES
Discharge Summary  Discharge Summary from Physical/Occupational Therapy Report    Patient: Ajith Givens   : 1970  Today's Date: 2024    Patient seen for 11 visits.  Dates of Service: 24 - 24    Discharge Status of Patient: See 24 treatment note for detail.    Goals: Partially Met    Discharge Plan: Patient to return to referring/providing physician    Comments: Honorio was making some progress with therapy but all goals not met. He did not return for more visits after the 24 visit therefore being discharged from therapy.       Thank you for this referral to [facility].    SIGNATURE: Brenda Kendall, PT

## 2025-02-26 RX ORDER — CELECOXIB 200 MG/1
200 CAPSULE ORAL DAILY
Qty: 30 CAPSULE | Refills: 2 | Status: SHIPPED | OUTPATIENT
Start: 2025-02-26

## 2025-06-02 RX ORDER — CELECOXIB 200 MG/1
200 CAPSULE ORAL DAILY
Qty: 30 CAPSULE | Refills: 2 | Status: SHIPPED | OUTPATIENT
Start: 2025-06-02